# Patient Record
(demographics unavailable — no encounter records)

---

## 2024-10-08 NOTE — PROCEDURE
[Straight Catheterization] : insertion of a straight catheter [Urinary Tract Infection] : a urinary tract infection [___ Fr Straight Tip] : a [unfilled] in [place] straight tip catheter [None] : there were no complications with the catheter insertion [Clear] : clear [Culture] : culture

## 2024-10-08 NOTE — PROCEDURE
[Straight Catheterization] : insertion of a straight catheter [Urinary Tract Infection] : a urinary tract infection [___ Fr Straight Tip] : a [unfilled] in [place]ian straight tip catheter [None] : there were no complications with the catheter insertion [Clear] : clear [Culture] : culture

## 2024-10-09 NOTE — LETTER BODY
[Dear  ___] : Dear  [unfilled], [I had the pleasure of evaluating your patient, [unfilled]. Thank you for referring Ms. [unfilled] for consultation for ___] : I had the pleasure of evaluating your patient, [unfilled]. Thank you for referring Ms. [unfilled] for consultation for [unfilled]. [Attached please find my note.] : Attached please find my note. [Thank you very much for allowing me to participate in the care of this patient. If you have any questions, please do not hesitate to contact me] : Thank you very much for allowing me to participate in the care of this patient. If you have any questions, please do not hesitate to contact me. [FreeTextEntry1] : prolapse, overactive bladder / urgency incontinence

## 2024-10-09 NOTE — PHYSICAL EXAM
[Chaperone Present] : A chaperone was present in the examining room during all aspects of the physical examination [Labia Majora] : were normal [Labia Minora] : were normal [Normal Appearance] : general appearance was normal [Atrophy] : atrophy [Cystocele] : a cystocele [No Bleeding] : there was no active vaginal bleeding [Exam Deferred] : was deferred [67733] : A chaperone was present during the pelvic exam. [Scar] : a scar was noted [Rectocele] : a rectocele [Uterine Prolapse] : uterine prolapse [2] : 2 [Aa ____] : Aa [unfilled] [Ba ____] : Ba [unfilled] [C ____] : C [unfilled] [GH ____] : GH [unfilled] [PB ____] : PB [unfilled] [TVL ____] : TVL  [unfilled] [Ap ____] : Ap [unfilled] [Bp ____] : Bp [unfilled] [D ____] : D [unfilled] [Normal rectal exam] : was normal [Normal] : was normal [FreeTextEntry2] : Janice [FreeTextEntry1] : General: Well, appearing. Alert and orientated. No acute distress HEENT: Normocephalic, atraumatic and extraocular muscles appear to be intact Neck: Full range of motion, no obvious lymphadenopathy, deformities, or masses noted Respiratory: Speaking in full sentences comfortably, normal work of breathing and no cough during visit Musculoskeletal: full range of motion  Extremities: No upper extremity edema noted Skin: no obvious rash or skin lesions Neuro: Orientated X 3, speech is fluent, normal rate Psych: Normal mood and affect [Tenderness] : ~T no ~M abdominal tenderness observed [Distended] : not distended [FreeTextEntry3] : (+) hypermobility, (-) cough stress test

## 2024-10-09 NOTE — HISTORY OF PRESENT ILLNESS
[Vaginal Wall Prolapse] : no [Unable To Restrain Bowel Movement] : no [Urinary Tract Infection] : no [Urinary Frequency] : no [Feelings Of Urinary Urgency] : no [Pain During Urination (Dysuria)] : no [Pelvic Pain] : no [Vaginal Pain] : no [Rectal Pain] : no [Constipation Obstructed Defecation] : no [Stool Visible Blood] : no [Incomplete Emptying Of Stool] : no [x2] : nocturia two times a night [] : years ago [de-identified] : daily [FreeTextEntry5] : daily [de-identified] : daily  [de-identified] : unable to quantify, sometimes every 10 minutes, can hold her urine up to 2 hours  [de-identified] : daily [FreeTextEntry1] : Rachelle is a 77yo who presents with bothersome pelvic bulge symptoms for several weeks. She is interested in surgical correction. She has leakage with an urge. She also voids frequently, sometimes every 10min but can hold her urine up to 2 hours. Sometimes, she uses pantyliners when she goes outside in case she has leakage.   PMH: HLD. Denies history of glaucoma.  PSH: Knee and shoulder surgery Soc: Never smoked All: NKDA   Daily fluid intake: 3 bottles water + 1c coffee + 2-3c tea. No juice, soda, seltzer, alcohol. Drinks tea before bed.

## 2024-10-09 NOTE — DISCUSSION/SUMMARY
[FreeTextEntry1] : Images were used to review the findings of Stage 2 uterovaginal prolapse, cystocele, rectocele with the patient. Treatment options for the prolapse were discussed and included observation, pelvic floor exercises with or without physical therapy, a pessary, or surgical correction. Surgically we discussed the abdominal vs the vaginal routes. Abdominally we discussed a hysterectomy and a sacral colpopexy either via laparotomy or laparoscopically and robotically. Vaginally we discussed a vaginal hysterectomy and native tissue repair. Surgically we discussed hysteropexy as well as the use of biologics. If interested in surgery, we discussed obtaining a pelvic ultrasound, her prior cervical cancer screenings, and urodynamics. We also discussed the perioperative course and reviewed postoperative restrictions of complete pelvic rest and avoidance of strenuous exercise/heavy lifting (>10lb) for 6 weeks.    We reviewed her urinary symptoms and exam findings and discussed possible etiologies including urinary tract infection, overactive bladder, urgency incontinence. Her PVR today was 30ml. Cath urine specimen was sent for urine culture to rule out urinary tract infection. We discussed management options for overactive bladder including observation, fluid and behavioral modifications, bladder training, physical therapy and medications including anticholinergics and beta 3 agonists. Specifically, we discussed avoidance of bladder irritants such as caffeine, carbonation, artificial sweeteners, alcohol, acidic foods/drinks (citrus, tomatoes, pineapple), spicy foods, and chocolate. We also discussed drinking 1-1.5L of plain water to maintain adequate hydration as urine that is too concentrated can also be irritating to the bladder. In addition, we reviewed drinking slowly since ingesting large quantities of fluid can result in rapid distension of the bladder, which can worsen urinary symptoms.  At this time, she is interested in surgical correction of her prolapse. She understands surgery for her prolapse does not treat her urinary symptoms. Her urinary symptoms are less bothersome to her, and she wants to focus on her prolapse first. Pelvic US requisition given to patient.   [facility] handout on POP given to patient in Ugandan. RTO for urodynamics.

## 2024-10-09 NOTE — PHYSICAL EXAM
[Chaperone Present] : A chaperone was present in the examining room during all aspects of the physical examination [Labia Majora] : were normal [Labia Minora] : were normal [Normal Appearance] : general appearance was normal [Atrophy] : atrophy [Cystocele] : a cystocele [No Bleeding] : there was no active vaginal bleeding [Exam Deferred] : was deferred [73223] : A chaperone was present during the pelvic exam. [Scar] : a scar was noted [Rectocele] : a rectocele [Uterine Prolapse] : uterine prolapse [2] : 2 [Aa ____] : Aa [unfilled] [Ba ____] : Ba [unfilled] [C ____] : C [unfilled] [GH ____] : GH [unfilled] [PB ____] : PB [unfilled] [TVL ____] : TVL  [unfilled] [Ap ____] : Ap [unfilled] [Bp ____] : Bp [unfilled] [D ____] : D [unfilled] [Normal rectal exam] : was normal [Normal] : was normal [FreeTextEntry2] : Janice [FreeTextEntry1] : General: Well, appearing. Alert and orientated. No acute distress HEENT: Normocephalic, atraumatic and extraocular muscles appear to be intact Neck: Full range of motion, no obvious lymphadenopathy, deformities, or masses noted Respiratory: Speaking in full sentences comfortably, normal work of breathing and no cough during visit Musculoskeletal: full range of motion  Extremities: No upper extremity edema noted Skin: no obvious rash or skin lesions Neuro: Orientated X 3, speech is fluent, normal rate Psych: Normal mood and affect [Tenderness] : ~T no ~M abdominal tenderness observed [Distended] : not distended [FreeTextEntry3] : (+) hypermobility, (-) cough stress test

## 2024-10-09 NOTE — DISCUSSION/SUMMARY
[FreeTextEntry1] : Images were used to review the findings of Stage 2 uterovaginal prolapse, cystocele, rectocele with the patient. Treatment options for the prolapse were discussed and included observation, pelvic floor exercises with or without physical therapy, a pessary, or surgical correction. Surgically we discussed the abdominal vs the vaginal routes. Abdominally we discussed a hysterectomy and a sacral colpopexy either via laparotomy or laparoscopically and robotically. Vaginally we discussed a vaginal hysterectomy and native tissue repair. Surgically we discussed hysteropexy as well as the use of biologics. If interested in surgery, we discussed obtaining a pelvic ultrasound, her prior cervical cancer screenings, and urodynamics. We also discussed the perioperative course and reviewed postoperative restrictions of complete pelvic rest and avoidance of strenuous exercise/heavy lifting (>10lb) for 6 weeks.    We reviewed her urinary symptoms and exam findings and discussed possible etiologies including urinary tract infection, overactive bladder, urgency incontinence. Her PVR today was 30ml. Cath urine specimen was sent for urine culture to rule out urinary tract infection. We discussed management options for overactive bladder including observation, fluid and behavioral modifications, bladder training, physical therapy and medications including anticholinergics and beta 3 agonists. Specifically, we discussed avoidance of bladder irritants such as caffeine, carbonation, artificial sweeteners, alcohol, acidic foods/drinks (citrus, tomatoes, pineapple), spicy foods, and chocolate. We also discussed drinking 1-1.5L of plain water to maintain adequate hydration as urine that is too concentrated can also be irritating to the bladder. In addition, we reviewed drinking slowly since ingesting large quantities of fluid can result in rapid distension of the bladder, which can worsen urinary symptoms.  At this time, she is interested in surgical correction of her prolapse. She understands surgery for her prolapse does not treat her urinary symptoms. Her urinary symptoms are less bothersome to her, and she wants to focus on her prolapse first. Pelvic US requisition given to patient.   [facility] handout on POP given to patient in [place] Zealander. RTO for urodynamics.

## 2024-10-09 NOTE — HISTORY OF PRESENT ILLNESS
[Vaginal Wall Prolapse] : no [Unable To Restrain Bowel Movement] : no [Urinary Tract Infection] : no [Urinary Frequency] : no [Feelings Of Urinary Urgency] : no [Pain During Urination (Dysuria)] : no [Pelvic Pain] : no [Vaginal Pain] : no [Rectal Pain] : no [Constipation Obstructed Defecation] : no [Stool Visible Blood] : no [Incomplete Emptying Of Stool] : no [x2] : nocturia two times a night [] : years ago [de-identified] : daily [FreeTextEntry5] : daily [de-identified] : daily  [de-identified] : unable to quantify, sometimes every 10 minutes, can hold her urine up to 2 hours  [de-identified] : daily [FreeTextEntry1] : Rachelle is a 79yo who presents with bothersome pelvic bulge symptoms for several weeks. She is interested in surgical correction. She has leakage with an urge. She also voids frequently, sometimes every 10min but can hold her urine up to 2 hours. Sometimes, she uses pantyliners when she goes outside in case she has leakage.   PMH: HLD. Denies history of glaucoma.  PSH: Knee and shoulder surgery Soc: Never smoked All: NKDA   Daily fluid intake: 3 bottles water + 1c coffee + 2-3c tea. No juice, soda, seltzer, alcohol. Drinks tea before bed.

## 2024-10-09 NOTE — REASON FOR VISIT
[Initial Visit ___] : an initial visit for [unfilled] [[employer] ] : provided by [employer]   [Pelvic Organ Prolapse] : pelvic organ prolapse [Interpreters_IDNumber] : [id number] [Interpreters_FullName] : Joao [[id number]] : [place]

## 2024-10-09 NOTE — REASON FOR VISIT
[Initial Visit ___] : an initial visit for [unfilled] [[employer] ] : provided by [employer]   [Pelvic Organ Prolapse] : pelvic organ prolapse [Interpreters_IDNumber] : [id number] [Interpreters_FullName] : Joao [[id number]] : Emir[place]

## 2024-10-22 NOTE — REVIEW OF SYSTEMS
[Incontinence] : incontinence [Nocturia] : nocturia [Joint Pain] : joint pain [Back Pain] : back pain [Negative] : Heme/Lymph [Fatigue] : fatigue [Fever] : no fever [Chills] : no chills [Hot Flashes] : no hot flashes [Night Sweats] : no night sweats [Recent Change In Weight] : ~T no recent weight change [Dysuria] : no dysuria [Hematuria] : no hematuria [Vaginal Discharge] : no vaginal discharge

## 2024-10-22 NOTE — HISTORY OF PRESENT ILLNESS
[FreeTextEntry1] : Stablish Care  [de-identified] : 79 y/o female with medical hx of Osteoporosis, Mastoid fistula, Urinary incontinence,  rectocele, Cystocele, and uterine prolapse presents to the clinic to establish care.  Patient reports that she continues to experience fullness/discomfort around the suprapubic region as well as her vaginal canal. Patient understands that her symptoms are due to Gynecological problems. Patient has an appointment with GYN on October 30th in preparation for GYN surgery. Moreover, during this visit patient complains of chronic left sided lower back pain that radiates to the left extremity. Patient has been work up for sciatica in the past, and has used acupuncture for relieve. Other than that, patient denies any other complains.

## 2024-10-22 NOTE — COUNSELING
[Fall prevention counseling provided] : Fall prevention counseling provided [Potential consequences of obesity discussed] : Potential consequences of obesity discussed [Encouraged to increase physical activity] : Encouraged to increase physical activity [None] : None

## 2024-10-22 NOTE — PHYSICAL EXAM
[No Acute Distress] : no acute distress [Well Nourished] : well nourished [Well-Appearing] : well-appearing [Normal Sclera/Conjunctiva] : normal sclera/conjunctiva [EOMI] : extraocular movements intact [Normal Outer Ear/Nose] : the outer ears and nose were normal in appearance [Normal Oropharynx] : the oropharynx was normal [No Lymphadenopathy] : no lymphadenopathy [Supple] : supple [Thyroid Normal, No Nodules] : the thyroid was normal and there were no nodules present [Clear to Auscultation] : lungs were clear to auscultation bilaterally [Normal Percussion] : the chest was normal to percussion [Normal Rate] : normal rate  [Regular Rhythm] : with a regular rhythm [Normal S1, S2] : normal S1 and S2 [No Carotid Bruits] : no carotid bruits [No Varicosities] : no varicosities [Pedal Pulses Present] : the pedal pulses are present [Soft] : abdomen soft [Non-distended] : non-distended [Normal Bowel Sounds] : normal bowel sounds [Normal Supraclavicular Nodes] : no supraclavicular lymphadenopathy [No CVA Tenderness] : no CVA  tenderness [No Spinal Tenderness] : no spinal tenderness [Grossly Normal Strength/Tone] : grossly normal strength/tone [No Rash] : no rash [No Skin Lesions] : no skin lesions [No Focal Deficits] : no focal deficits [Normal Gait] : normal gait [Deep Tendon Reflexes (DTR)] : deep tendon reflexes were 2+ and symmetric [Normal] : affect was normal and insight and judgment were intact [de-identified] : 1+ pitting B/L lower extremity edema  [de-identified] : Right Periumbillical and suprapubic tenderness noted

## 2024-10-22 NOTE — PLAN
[FreeTextEntry1] : #GYN  Hx of Cystole, rectocele, partial Uterine Prolapse Complaints of suprapubic tenderness. + Suprapubic tenderness on PE.  Urinary frequency/ incontinence/ denies dysuria  - Follow up UA  W/ reflex culture   #Health Maintenance  - CBC, BMP   #Fatigue - Follow up TSH w/ Reflex   #HLD Patient has Hx of HLD, on Rosuvastatin - Follow-up Lipid Panel   #Sciatica Left  The patient complained of Left-sided lower back pain radiating to the left leg. - Follow up Lumbar x-ray  - Physical medicine  - Patient advised to take NSAIDs for pain management   #Easy bruising  PT/INR   #Osteoporosis No recent falls or fractures  DEXA scan done last year, exact date unknown   #Mastoid Fistula  Patient has History of Mastoid Fistula Evaluated by ENT, completed course of abx Reports no symptoms   #Prevention The patient had Flu shot on 10/18/24 Refused Shingles and Pneumoccocal  I will reconsider on the next visit.   RTC once Surgery date is established 
breastfeeding exclusively

## 2024-10-22 NOTE — HEALTH RISK ASSESSMENT
[Fair] : ~his/her~ current health as fair  [Good] : ~his/her~  mood as  good [Intercurrent ED visits] : went to ED [No] : In the past 12 months have you used drugs other than those required for medical reasons? No [No falls in past year] : Patient reported no falls in the past year [0] : 2) Feeling down, depressed, or hopeless: Not at all (0) [PHQ-2 Negative - No further assessment needed] : PHQ-2 Negative - No further assessment needed [Never] : Never [Patient reported mammogram was normal] : Patient reported mammogram was normal [Patient reported bone density results were normal] : Patient reported bone density results were normal [Language] : difficulty with language [Alone] : lives alone [Retired] : retired [Feels Safe at Home] : Feels safe at home [Fully functional (bathing, dressing, toileting, transferring, walking, feeding)] : Fully functional (bathing, dressing, toileting, transferring, walking, feeding) [Fully functional (using the telephone, shopping, preparing meals, housekeeping, doing laundry, using] : Fully functional and needs no help or supervision to perform IADLs (using the telephone, shopping, preparing meals, housekeeping, doing laundry, using transportation, managing medications and managing finances) [de-identified] : In august for ear pain  [de-identified] : Neurology  [de-identified] : Patient walks 30 minutes outside, 5 days a week  [de-identified] : erasmo  [[id number]] : 0 [Change in mental status noted] : No change in mental status noted [Sexually Active] : not sexually active [Reports changes in hearing] : Reports no changes in hearing [Reports changes in vision] : Reports no changes in vision [Reports changes in dental health] : Reports no changes in dental health [MammogramDate] : 10/17/2024 [BoneDensityDate] : 01/01/2024 [BoneDensityComments] : Done last year, at [facility], Patient w/ Osteoporosis  [FreeTextEntry2] : Patient is Jehovah Witness

## 2024-10-22 NOTE — ASSESSMENT
[FreeTextEntry1] : 79 y/o female with medical hx of Osteoporosis, Mastoid fistula, Urinary incontinence, rectocele, Cystocele, and uterine prolapse presents to the clinic to establish Care.

## 2024-11-06 NOTE — DISCUSSION/SUMMARY
[Visit Time ___ Minutes] : [unfilled] minutes [FreeTextEntry1] : Images were used to review the findings of Stage 2 uterovaginal prolapse, cystocele, rectocele with the patient. Treatment options for the prolapse were reviewed and included nonsurgical and surgical options. Surgically we discussed the abdominal vs the vaginal routes. Abdominally we discussed a hysterectomy and a sacral colpopexy either via laparotomy or laparoscopically and robotically. Vaginally we discussed a vaginal hysterectomy and native tissue repair with a uterosacral ligament suspension or an obliterative procedure. She is not sexually active and has no plans to become sexually active in the future. We discussed an obliterative procedure / colpocleisis is the most effective and longest lasting surgery; however, once it is done, she cannot have intercourse in the future and it is not reversible. Surgically we discussed hysteropexy as well as the use of biologics. We discussed her surgical plan may be altered if her pap smear is abnormal. We also discussed the perioperative course and reviewed postoperative restrictions of complete pelvic rest and avoidance of strenuous exercise/heavy lifting (>10lb) for 6 weeks.    We also reviewed her urodynamics findings of stress incontinence. Although there was no detrusor overactivity, we discussed she could still have an overactive bladder. Her PVR and longterm were normal.  Images were also used to demonstrate her incontinence and treatment options. We reviewed management options for stress urinary incontinence including: observation, pelvic floor exercises with or without a physical therapist, continence devices or pessaries, urethral bulking agents, and surgical management. We discussed surgical management options including a midurethral sling, hutchinson colposuspension, and pubovaginal sling using native tissue. We reviewed risks and benefits of each procedure. We discussed a concomitant continence procedure could be done at the same time as her prolapse repair.   She understands that surgery is not intended to treat urgency, frequency, or leakage with an urge since those are symptoms of her overactive bladder, which we would continue to manage postoperatively. In addition, she understands that her refusal of blood products also increases her surgical risk.   At this time, she wants to talk to her family about her surgical options--uterosacral ligament suspension vs colpocleisis. She is interested in surgery as soon as possible.   [facility] handout on USLS, colpocleisis, sling given to patient in English and Frisian. She will call once she has made a decision.

## 2024-11-06 NOTE — HISTORY OF PRESENT ILLNESS
[Vaginal Wall Prolapse] : no [Unable To Restrain Bowel Movement] : no [Urinary Tract Infection] : no [x2] : nocturia two times a night [Urinary Frequency] : no [Feelings Of Urinary Urgency] : no [Pain During Urination (Dysuria)] : no [] : years ago [Constipation Obstructed Defecation] : no [Stool Visible Blood] : no [Incomplete Emptying Of Stool] : no [Pelvic Pain] : no [Vaginal Pain] : no [Rectal Pain] : no [de-identified] : daily [FreeTextEntry5] : daily [de-identified] : daily  [de-identified] : unable to quantify, sometimes every 10 minutes, can hold her urine up to 2 hours  [de-identified] : daily [FreeTextEntry1] : Rachelle is a 79yo with Stage 2 uterovaginal prolapse, cystocele, rectocele. She is interested in surgical correction. She had a pelvic US that was normal. Urodynamics showed stress incontinence. She did not have any detrusor overactivity, ISD. Her PVR was 0ml and senior care was 311ml.   Denies history of bleeding, clotting, or problems with anesthesia. Denies personal or family history of breast or gynecologic malignancies.   Preop Eval: Pap: pending TVUS: 4.8x2.2x3.8cm, ES 0.2cm  UDS: +GERALDO at all volumes, no DO, no ISD, PVR 0ml, senior care 311ml  PMH: HLD. Denies history of glaucoma.  PSH: Knee and shoulder surgery, excisional breast biopsy (benign) Soc: Never smoked All: NKDA   Daily fluid intake: 3 bottles water + 1c coffee + 2-3c tea. No juice, soda, seltzer, alcohol. Drinks tea before bed.

## 2024-11-08 NOTE — PHYSICAL EXAM
[Vulvar Atrophy] : vulvar atrophy [Labia Majora] : normal [Labia Minora] : normal [Atrophy] : atrophy [Cystocele] : a cystocele [Normal] : normal [Uterine Adnexae] : normal [FreeTextEntry4] : 2 cm cyst on anterior vaginal wall near the introitus

## 2024-11-12 NOTE — DISCUSSION/SUMMARY
[Visit Time ___ Minutes] : [unfilled] minutes [FreeTextEntry1] : Rachelle presents with Stage 2 uterovaginal prolapse, cystocele, rectocele. We reviewed both nonsurgical and surgical options, and she continues to desire surgical management. She has been counseled regarding options for reconstructive and obliterative procedures. This includes both abdominal, laparoscopic, robotic, and vaginal options. The options for repairs with and without the use of mesh and biological materials were reviewed. She has elected for the vaginal approach to surgery. Based on our discussion she would like to go with a native tissue repair with a vaginal hysterectomy, uterosacral ligament suspension, and anterior posterior enterocele repair. For her stress incontinence, she desires a midurethral sling with mesh. She expressed understanding that mesh will be used for the antiincontinence procedure and it is a permanent implant.   We discussed success rates, failure rates, and possible risks. The risks discussed include, but are not limited to: changes to the vaginal anatomy, chronic pain, bleeding, dyspareunia, need for revision, vaginal discharge, urinary retention, worsening of stress urinary incontinence or urge incontinence, infection, failure of the procedure, neuropathy. We also extensively reviewed the risk of injury to the bowel, rectum, bladder, ureters, urethra, blood vessels, and nerves. We discussed the risk of sling mesh erosion and need for sling revision. We discussed the possible conversion to laparotomy or laparoscopy. We discussed the possibility of removing the ovaries/fallopian tubes and we indicated the ovaries would be removed only if they appeared grossly abnormal, otherwise they would remain in-situ. All risks were explained in layman's terms. Based on our discussion she would like to proceed with surgical correction. We reviewed the preoperative and postoperative instructions as well as hospital stay.   She had a small vaginal cyst on exam today. We discussed this is likely a simple cyst, but there was a small possibility it could be connected to her urethra (a diverticulum). If connected to the urethra and requires excision and repair of the urethra, we discussed we would not be able to do the sling portion of her surgery as it utilizes synthetic mesh. We discussed it does not appear near enough to the surgical site to interfere with her prolapse surgery as planned; however, she would like to be certain. We will obtain a pelvic MRI.   Consent was signed in the office for pelvic exam under anesthesia, vaginal hysterectomy, uterosacral ligament suspension, anterior posterior enterocele repair, midurethral sling with mesh, cystoscopy. Possible salpingo-oophorectomy, laparoscopy, laparotomy. A refusal of blood product form was also signed in the office as she is a [employer]. She understands that refusal of blood products can result in death.

## 2024-11-12 NOTE — REASON FOR VISIT
[Follow-Up Visit_____] : a follow-up visit for [unfilled] [[employer] ] : provided by [employer]   [Interpreters_IDNumber] : [id number] [Interpreters_FullName] : Chanelle [TWNoteComboBox1] : Cambodian

## 2024-11-12 NOTE — PHYSICAL EXAM
[Chaperone Present] : A chaperone was present in the examining room during all aspects of the physical examination [[id number]] : A chaperone was present during the pelvic exam. [Labia Majora] : were normal [Labia Minora] : were normal [Normal Appearance] : general appearance was normal [Rectocele] : a rectocele [Cystocele] : a cystocele [Uterine Prolapse] : uterine prolapse [No Bleeding] : there was no active vaginal bleeding [Aa ____] : Aa [unfilled] [Ba ____] : Ba [unfilled] [C ____] : C [unfilled] [GH ____] : GH [unfilled] [PB ____] : PB [unfilled] [TVL ____] : TVL  [unfilled] [Ap ____] : Ap [unfilled] [Bp ____] : Bp [unfilled] [D ____] : D [unfilled] [Normal] : no abnormalities [Exam Deferred] : was deferred [FreeTextEntry2] : Janice [FreeTextEntry1] : General: Well, appearing. Alert and orientated. No acute distress HEENT: Normocephalic, atraumatic and extraocular muscles appear to be intact Neck: Full range of motion, no obvious lymphadenopathy, deformities, or masses noted Respiratory: Speaking in full sentences comfortably, normal work of breathing and no cough during visit Musculoskeletal: full range of motion  Extremities: No upper extremity edema noted Skin: no obvious rash or skin lesions Neuro: Orientated X 3, speech is fluent, normal rate Psych: Normal mood and affect [Tenderness] : ~T no ~M abdominal tenderness observed [Distended] : not distended [FreeTextEntry4] : ~1cm vaginal cyst, nonfluctuant, nontender, no urethral discharge expressed with palpation

## 2024-11-12 NOTE — PROCEDURE
[Straight Catheterization] : insertion of a straight catheter [Urinary Tract Infection] : a urinary tract infection [___ Fr Straight Tip] : a [unfilled] in [place] straight tip catheter [None] : there were no complications with the catheter insertion [Clear] : clear [Culture] : culture

## 2024-11-12 NOTE — HISTORY OF PRESENT ILLNESS
[Vaginal Wall Prolapse] : no [Unable To Restrain Bowel Movement] : no [Urinary Tract Infection] : no [x2] : nocturia two times a night [Urinary Frequency] : no [Feelings Of Urinary Urgency] : no [Pain During Urination (Dysuria)] : no [] : years ago [Constipation Obstructed Defecation] : no [Stool Visible Blood] : no [Incomplete Emptying Of Stool] : no [Pelvic Pain] : no [Vaginal Pain] : no [Rectal Pain] : no [de-identified] : daily [FreeTextEntry5] : daily [de-identified] : daily  [de-identified] : unable to quantify, sometimes every 10 minutes, can hold her urine up to 2 hours  [de-identified] : daily [FreeTextEntry1] : Rachelle is a 79yo with Stage 2 uterovaginal prolapse, cystocele, rectocele. She is interested in surgical correction. She is a Christianity and does not accept any blood products.   Denies history of bleeding, clotting, or problems with anesthesia. Denies personal or family history of breast or gynecologic malignancies.   Preop Eval: Pap (11/6/24): NILM, HPV neg TVUS: 4.8x2.2x3.8cm, ES 0.2cm  UDS: +GERALDO at all volumes, no DO, no ISD, PVR 0ml, nursing home 311ml  PMH: HLD. Denies history of glaucoma.  PSH: Knee and shoulder surgery, excisional breast biopsy (benign) Soc: Never smoked All: NKDA   Daily fluid intake: 3 bottles water + 1c coffee + 2-3c tea. No juice, soda, seltzer, alcohol. Drinks tea before bed.

## 2024-11-13 NOTE — ASSESSMENT
[High Risk Surgery - Intraperitoneal, Intrathoracic or Supringuinal Vascular Procedures] : High Risk Surgery - Intraperitoneal, Intrathoracic or Supringuinal Vascular Procedures - No (0) [Ischemic Heart Disease] : Ischemic Heart Disease - No (0) [Congestive Heart Failure] : Congestive Heart Failure - No (0) [Prior Cerebrovascular Accident or TIA] : Prior Cerebrovascular Accident or TIA - No (0) [Creatinine >= 2mg/dL (1 Point)] : Creatinine >= 2mg/dL - No (0) [Insulin-dependent Diabetic (1 Point)] : Insulin-dependent Diabetic - No (0) [0] : 0 , RCRI Class: I, Risk of Post-Op Cardiac Complications: 3.9%, 95% CI for Risk Estimate: 2.8% - 5.4% [Patient Optimized for Surgery] : Patient optimized for surgery [ECG] : ECG [Continue medications as is] : Continue current medications [FreeTextEntry4] : #Pre-operative Clearance METS > 7 RCRI Class 1 risk Jett 0.1% EKG Sinus  Patient medically optimized for surgery

## 2024-11-13 NOTE — END OF VISIT
[] : Resident [FreeTextEntry3] : I, Dr. Emmanuel Farley, saw and evaluated this patient in the presence of the resident. I discussed the management with the resident. I reviewed the note and agree with the documented plan of care with the following confirmations/corrections/additions: None.

## 2024-11-13 NOTE — RESULTS/DATA
[] : results reviewed [ECG Reviewed] : reviewed [Sinus Bradycardia] : sinus bradycardia [P Waves Normal] : the P wave is normal [Normal QRS] : the QRS is normal [Normal ST Segments] : the ST segments are normal [Unavailable] : no prior ECGs were available for comparison [de-identified] : Results from Oct 22 normal. To follow up results from 11/12 [de-identified] : Results from Oct 22 normal. To follow up results from 11/12 [de-identified] : Results from Oct 22 normal. To follow up results from 11/12

## 2024-11-13 NOTE — END OF VISIT
Nutrition Assessment   Assessment Type: Follow up  Reason for Visit: Registered Dietitian Evaluation  Referral Requested By: Nurse  Chart Medications Lab Results Reviewed:  Yes    Nutritional Risk Factors: Unintentional weight loss and Poor PO prior to admission    Current Diet Order: Diabetic  Diet Tolerance:tolelrated  Food Allergies: no  Priority Points: Status 3    Demographic/Anthropometrics Information  Gender: female   Patient Age: 69 year old  Height:    Ht Readings from Last 1 Encounters:   10/18/22 5' 5.35\" (1.66 m)      Weight:   Wt Readings from Last 1 Encounters:   11/02/22 80.2 kg      BMI:   BMI Readings from Last 1 Encounters:   11/02/22 29.10 kg/m²     Ideal Body Wt:   Ideal body weight: 57.8 kg  Adjusted ideal body weight: 66.8 kg      Usual Body Weight:  Weight and Height Weight kg   9/28/2022 85.3 kg   9/29/2022 85.1 kg   10/1/2022 85.1 kg   10/2/2022 85.1 kg   10/3/2022 82.1 kg   10/4/2022 80.6 kg   10/6/2022 80.5 kg   10/7/2022 80.8 kg   10/10/2022 81 kg   10/11/2022    10/13/2022 80.65 kg   10/17/2022 79.6 kg   10/18/2022 80.6 kg     % Weight change: -5 % x three weeks  Reason for Weight Change: Decreased intake    Weight    10/30/22 0515 10/31/22 0417 11/01/22 0600 11/02/22 0746   Weight: 80 kg 78.7 kg 78.9 kg 80.2 kg     Weight trending up   Physical Appearance: Emaciated  Weight Classification: Overweight (BMI 25-29.9)    Nutrition Diagnosis (PES)  Inadequate oral intake related to Decreased intake as evidenced by Weight loss over time    Nutrition Plan  Recommended Nutrition Intervention: Coordination of nutrition care by a nutrition professional  Monitor: Biochemical data, medical tests, procedures, Food and beverage intake and Weight    Discharge Needs: Pending  Care Plan Discussed With: Patient  Goals: Meet >/= 75% of estimated needs  Goal Progress: Extended  Timeframe to Achieve Goal: 1-3 days    Dietitian Notes/Impressions/Recommendations:  Dx: admitted today for Day 2 and 3 of Cycle  Pt unable to participate in OT due to: 
[]  Nausea/vomiting 
[]  Eating 
[]  Pain 
[]  Pt lethargic [x]  Off Unit at 10:18 and 11:10 AM 
Will f/u later as schedule allows. Thank you.  
Surjit Mathews, OT 
 3 R-ICE chemotherapy. Tolerated Rituximab and Etoposide well in clinic yesterday.   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 Reports weight loss recently as she has not been able to eat well. Pt. Lives alone and have no energy to cook. Was mostly eating frozen dinner which are not appetizing anymore. Denies any N/V/D/C at this time.   10/24: Pt. With sub optimal appetite and PO. Does not like the food in here. Tries to eat three times a day. Is compliant with ONS. Denies any GI distress at this time.   1028: Pt. With poor to fair appetite and PO. Reports taste doesn't seems right and complains of dry mouth. Has constipation.   11/2: Pt not alert during at time of visit. Per RN, pt has very poor appetite and PO intake and is barely eating ~5% of meals. RN reported that MD has been notified about poor appetite.     TREATMENT PLAN: Monitoring & Interventions  1. Diet: Diabetic diet appropriate. Consider alternative forms of nutrition administration if pt continues to have poor intake.   - Small frequent meals  - Fluids with all foods  - biotene spray  - Prunes PRN  2. Oral nutrition supplement: Recommend EnsurePlus TID  - Magic cup TID  3. RD monitoring intake trends, weight, labs.  Further recommendations based on clinical course.    Malnutrition Status: Moderate malnutrition related to chronic illness as evidenced by significant weight loss in past three weeks, eating poorly >1week.     Ophelia Rosen  Dietetic Intern  Contact via Epic chat     [] : Resident [FreeTextEntry3] : I, Dr. Emmanuel Farley, saw and evaluated this patient in the presence of the resident. I discussed the management with the resident. I reviewed the note and agree with the documented plan of care with the following confirmations/corrections/additions: None.

## 2024-11-13 NOTE — RESULTS/DATA
[] : results reviewed [ECG Reviewed] : reviewed [Sinus Bradycardia] : sinus bradycardia [P Waves Normal] : the P wave is normal [Normal QRS] : the QRS is normal [Normal ST Segments] : the ST segments are normal [Unavailable] : no prior ECGs were available for comparison [de-identified] : Results from Oct 22 normal. To follow up results from 11/12 [de-identified] : Results from Oct 22 normal. To follow up results from 11/12 [de-identified] : Results from Oct 22 normal. To follow up results from 11/12

## 2024-11-13 NOTE — RESULTS/DATA
[] : results reviewed [ECG Reviewed] : reviewed [Sinus Bradycardia] : sinus bradycardia [P Waves Normal] : the P wave is normal [Normal QRS] : the QRS is normal [Normal ST Segments] : the ST segments are normal [Unavailable] : no prior ECGs were available for comparison [de-identified] : Results from Oct 22 normal. To follow up results from 11/12 [de-identified] : Results from Oct 22 normal. To follow up results from 11/12 [de-identified] : Results from Oct 22 normal. To follow up results from 11/12

## 2024-11-13 NOTE — RESULTS/DATA
[] : results reviewed [ECG Reviewed] : reviewed [Sinus Bradycardia] : sinus bradycardia [P Waves Normal] : the P wave is normal [Normal QRS] : the QRS is normal [Normal ST Segments] : the ST segments are normal [Unavailable] : no prior ECGs were available for comparison [de-identified] : Results from Oct 22 normal. To follow up results from 11/12 [de-identified] : Results from Oct 22 normal. To follow up results from 11/12 [de-identified] : Results from Oct 22 normal. To follow up results from 11/12

## 2024-11-13 NOTE — RESULTS/DATA
[] : results reviewed [ECG Reviewed] : reviewed [Sinus Bradycardia] : sinus bradycardia [P Waves Normal] : the P wave is normal [Normal QRS] : the QRS is normal [Normal ST Segments] : the ST segments are normal [Unavailable] : no prior ECGs were available for comparison [de-identified] : Results from Oct 22 normal. To follow up results from 11/12 [de-identified] : Results from Oct 22 normal. To follow up results from 11/12 [de-identified] : Results from Oct 22 normal. To follow up results from 11/12

## 2024-11-14 NOTE — HISTORY OF PRESENT ILLNESS
[No Pertinent Cardiac History] : no history of aortic stenosis, atrial fibrillation, coronary artery disease, recent myocardial infarction, or implantable device/pacemaker [No Pertinent Pulmonary History] : no history of asthma, COPD, sleep apnea, or smoking [(Patient denies any chest pain, claudication, dyspnea on exertion, orthopnea, palpitations or syncope)] : Patient denies any chest pain, claudication, dyspnea on exertion, orthopnea, palpitations or syncope [Good (7-10 METs)] : Good (7-10 METs) [FreeTextEntry2] : 12/2/2024 [FreeTextEntry4] : 77 y/o F with PMHx osteoporosis, Mastoid fistula, Urinary incontinence, rectocele, Cystocele, and uterine prolapse here to obtain bloodwork for scheduled vaginal hysterectomy, uterosacral ligament suspension, anterior posterior enterocele repair on Dec 2. Reports that urinary symptoms have not changed - when drinking small amounts of water, has to go to bathroom urgently. Denies any current pelvic pain, vaginal bleeding, dysuria. Also reports some mild inflammation on right cheek bone that started 3 days ago - adds that it is most likely due to dry air from outside.  [FreeTextEntry3] : Dr. Aniyah Mckeon [FreeTextEntry1] : Here to complete bloodwork for surgery Dec 2 [de-identified] : 77 y/o F with PMHx osteoporosis, Mastoid fistula, Urinary incontinence, rectocele, Cystocele, and uterine prolapse here to obtain bloodwork for scheduled vaginal hysterectomy, uterosacral ligament suspension, anterior posterior enterocele repair on dec 2.   Reports that urinary symptoms have not changed - when drinking small amounts of water, has to go to bathroom urgently.  Denies any current pelvic pain, vaginal bleeding, dysuria.   METS > 7  Eye swelling right eye - 3 days ago. Saw eye doctor.

## 2024-11-14 NOTE — INTERPRETER SERVICES
[Time Spent: ____ minutes] : Total time spent using  services: [unfilled] minutes. The patient's primary language is not English thus required  services. [Interpreters_IDNumber] : [id number] [TWNoteComboBox1] : Tunisian

## 2024-11-14 NOTE — HISTORY OF PRESENT ILLNESS
[No Pertinent Cardiac History] : no history of aortic stenosis, atrial fibrillation, coronary artery disease, recent myocardial infarction, or implantable device/pacemaker [No Pertinent Pulmonary History] : no history of asthma, COPD, sleep apnea, or smoking [(Patient denies any chest pain, claudication, dyspnea on exertion, orthopnea, palpitations or syncope)] : Patient denies any chest pain, claudication, dyspnea on exertion, orthopnea, palpitations or syncope [Good (7-10 METs)] : Good (7-10 METs) [FreeTextEntry2] : 12/2/2024 [FreeTextEntry4] : 79 y/o F with PMHx osteoporosis, Mastoid fistula, Urinary incontinence, rectocele, Cystocele, and uterine prolapse here to obtain bloodwork for scheduled vaginal hysterectomy, uterosacral ligament suspension, anterior posterior enterocele repair on Dec 2. Reports that urinary symptoms have not changed - when drinking small amounts of water, has to go to bathroom urgently. Denies any current pelvic pain, vaginal bleeding, dysuria. Also reports some mild inflammation on right cheek bone that started 3 days ago - adds that it is most likely due to dry air from outside.  [FreeTextEntry3] : Dr. Aniyah Mckeon [FreeTextEntry1] : Here to complete bloodwork for surgery Dec 2 [de-identified] : 79 y/o F with PMHx osteoporosis, Mastoid fistula, Urinary incontinence, rectocele, Cystocele, and uterine prolapse here to obtain bloodwork for scheduled vaginal hysterectomy, uterosacral ligament suspension, anterior posterior enterocele repair on dec 2.   Reports that urinary symptoms have not changed - when drinking small amounts of water, has to go to bathroom urgently.  Denies any current pelvic pain, vaginal bleeding, dysuria.   METS > 7  Eye swelling right eye - 3 days ago. Saw eye doctor.

## 2024-11-14 NOTE — INTERPRETER SERVICES
[Time Spent: ____ minutes] : Total time spent using  services: [unfilled] minutes. The patient's primary language is not English thus required  services. [Interpreters_IDNumber] : [id number] [TWNoteComboBox1] : Citizen of [place]

## 2024-11-14 NOTE — INTERPRETER SERVICES
[Time Spent: ____ minutes] : Total time spent using  services: [unfilled] minutes. The patient's primary language is not English thus required  services. [Interpreters_IDNumber] : [id number] [TWNoteComboBox1] : Martiniquais

## 2024-11-14 NOTE — HISTORY OF PRESENT ILLNESS
[No Pertinent Cardiac History] : no history of aortic stenosis, atrial fibrillation, coronary artery disease, recent myocardial infarction, or implantable device/pacemaker [No Pertinent Pulmonary History] : no history of asthma, COPD, sleep apnea, or smoking [(Patient denies any chest pain, claudication, dyspnea on exertion, orthopnea, palpitations or syncope)] : Patient denies any chest pain, claudication, dyspnea on exertion, orthopnea, palpitations or syncope [Good (7-10 METs)] : Good (7-10 METs) [FreeTextEntry2] : 12/2/2024 [FreeTextEntry3] : Dr. Aniyah Mckeon [FreeTextEntry4] : 77 y/o F with PMHx osteoporosis, Mastoid fistula, Urinary incontinence, rectocele, Cystocele, and uterine prolapse here to obtain bloodwork for scheduled vaginal hysterectomy, uterosacral ligament suspension, anterior posterior enterocele repair on Dec 2. Reports that urinary symptoms have not changed - when drinking small amounts of water, has to go to bathroom urgently. Denies any current pelvic pain, vaginal bleeding, dysuria. Also reports some mild inflammation on right cheek bone that started 3 days ago - adds that it is most likely due to dry air from outside.  [FreeTextEntry1] : Here to complete bloodwork for surgery Dec 2 [de-identified] : 79 y/o F with PMHx osteoporosis, Mastoid fistula, Urinary incontinence, rectocele, Cystocele, and uterine prolapse here to obtain bloodwork for scheduled vaginal hysterectomy, uterosacral ligament suspension, anterior posterior enterocele repair on dec 2.   Reports that urinary symptoms have not changed - when drinking small amounts of water, has to go to bathroom urgently.  Denies any current pelvic pain, vaginal bleeding, dysuria.   METS > 7  Eye swelling right eye - 3 days ago. Saw eye doctor.

## 2024-11-14 NOTE — INTERPRETER SERVICES
[Time Spent: ____ minutes] : Total time spent using  services: [unfilled] minutes. The patient's primary language is not English thus required  services. [Interpreters_IDNumber] : [id number] [TWNoteComboBox1] : Vietnamese

## 2024-11-14 NOTE — INTERPRETER SERVICES
[Time Spent: ____ minutes] : Total time spent using  services: [unfilled] minutes. The patient's primary language is not English thus required  services. [Interpreters_IDNumber] : [id number] [TWNoteComboBox1] : Uzbek

## 2024-11-14 NOTE — HISTORY OF PRESENT ILLNESS
[No Pertinent Cardiac History] : no history of aortic stenosis, atrial fibrillation, coronary artery disease, recent myocardial infarction, or implantable device/pacemaker [No Pertinent Pulmonary History] : no history of asthma, COPD, sleep apnea, or smoking [(Patient denies any chest pain, claudication, dyspnea on exertion, orthopnea, palpitations or syncope)] : Patient denies any chest pain, claudication, dyspnea on exertion, orthopnea, palpitations or syncope [Good (7-10 METs)] : Good (7-10 METs) [FreeTextEntry2] : 12/2/2024 [FreeTextEntry3] : Dr. Aniyah Mckeon [FreeTextEntry4] : 77 y/o F with PMHx osteoporosis, Mastoid fistula, Urinary incontinence, rectocele, Cystocele, and uterine prolapse here to obtain bloodwork for scheduled vaginal hysterectomy, uterosacral ligament suspension, anterior posterior enterocele repair on Dec 2. Reports that urinary symptoms have not changed - when drinking small amounts of water, has to go to bathroom urgently. Denies any current pelvic pain, vaginal bleeding, dysuria. Also reports some mild inflammation on right cheek bone that started 3 days ago - adds that it is most likely due to dry air from outside.  [FreeTextEntry1] : Here to complete bloodwork for surgery Dec 2 [de-identified] : 77 y/o F with PMHx osteoporosis, Mastoid fistula, Urinary incontinence, rectocele, Cystocele, and uterine prolapse here to obtain bloodwork for scheduled vaginal hysterectomy, uterosacral ligament suspension, anterior posterior enterocele repair on dec 2.   Reports that urinary symptoms have not changed - when drinking small amounts of water, has to go to bathroom urgently.  Denies any current pelvic pain, vaginal bleeding, dysuria.   METS > 7  Eye swelling right eye - 3 days ago. Saw eye doctor.
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
## 2024-11-22 NOTE — HISTORY OF PRESENT ILLNESS
[FreeTextEntry1] : follow-up [de-identified] : Patient is a 79 y/o F with PMHx HLD, osteoporosis, Mastoid fistula, Urinary incontinence, rectocele, Cystocele, and uterine prolapse here to obtain bloodwork for scheduled vaginal hysterectomy, uterosacral ligament suspension, anterior posterior enterocele repair on Dec 2, 2024. Still reports that urinary symptoms have not changed - when drinking small amounts of water, has to go to bathroom urgently. She denies any headache, dizziness, nausea, vomiting, cough, fever, chest pain, shortness of breath, palpitation, heartburn, abdominal pain, diarrhea, constipation, dysuria, hematuria, back pain, joint pain, weight loss, loss of appetite.

## 2024-12-06 NOTE — OBJECTIVE
[Voiding Trial] : Voiding trial was performed [Clean, Dry, Intact] : Clean, Dry, Intact [Good Support] : Good support [Healing well] : healing well [No Masses or Tenderness] : no masses or tenderness [FreeTextEntry1] : No CVA tenderness, mild tenderness to palpation of the bladder [FreeTextEntry3] : No mesh visualized, sutures intact, no active bleeding

## 2024-12-06 NOTE — SUBJECTIVE
[FreeTextEntry1] : No acute distress noted. Denies dizziness, lightheadedness currently.  [FreeTextEntry8] : Tylenol, ibuprofen, oxycodone [FreeTextEntry7] : Moderately controlled with PO meds [FreeTextEntry6] : Tolerating regular diet. No nausea/vomiting [FreeTextEntry5] : Mcdowell cath in situ and draining [FreeTextEntry4] : Taking miralax.  [FreeTextEntry9] : Some vaginal bleeding -- used 2 pads yesterday, no clots

## 2024-12-06 NOTE — DISCUSSION/SUMMARY
[FreeTextEntry1] : Rachelle is a 77yo s/p vaginal hysterectomy, uterosacral ligament suspension, anterior posterior enterocele repair, retropubic midurethral sling, cystoscopy on 12/2/24. She passed her TOV today. We reviewed emergency precautions. We discussed taking an antibiotic for a presumed UTI.   RTO on 12/17 as scheduled for POA.

## 2024-12-06 NOTE — REASON FOR VISIT
[Follow-up Visit ___] : a follow-up visit  for [unfilled] [[employer] ] : provided by [employer]   [Interpreters_IDNumber] : [id number] [Interpreters_FullName] : Terrie [TWNoteComboBox1] : Bel[place]ian

## 2024-12-17 NOTE — OBJECTIVE
[Post Void Residual ____ ml] : Post Void Residual was [unfilled] ml [Soft and Nontender] : soft and nontender [Clean, Dry, Intact] : Clean, Dry, Intact [Good Support] : Good support [Healing well] : healing well [No Masses or Tenderness] : no masses or tenderness [FreeTextEntry3] : No mesh visualized, sutures intact, no active bleeding

## 2024-12-17 NOTE — SUBJECTIVE
[FreeTextEntry1] : No acute distress noted.  [FreeTextEntry8] : None [FreeTextEntry7] : None [FreeTextEntry6] : Tolerating regular diet. No nausea/vomiting [FreeTextEntry4] : Taking miralax. No constipation [FreeTextEntry5] : Normal. No dysuria. A few episodes of incontinence when she coughed and sneezed. Feels incomplete emptying sometimes. Yesterday, she voided hourly and 3x/n.  [FreeTextEntry9] : Some vaginal bleeding

## 2024-12-17 NOTE — REASON FOR VISIT
[Follow-up Visit ___] : a follow-up visit  for [unfilled] [[employer] ] : provided by [employer]   [Interpreters_IDNumber] : [id number] [Interpreters_FullName] : Jim Gregory [TWNoteComboBox1] : Scottish

## 2024-12-17 NOTE — DISCUSSION/SUMMARY
[FreeTextEntry1] : Rachelle is a 79yo s/p vaginal hysterectomy, uterosacral ligament suspension, anterior posterior enterocele repair, retropubic midurethral sling, cystoscopy on 12/2/24. Cath PVR was 30ml after she voided a second time. Urine sent for UC to rule out urinary tract infection. She is meeting her postop milestones. Pathology was benign and reviewed with the patient. Postop instructions reviewed.   RTO in 4wk for POA.

## 2025-01-14 NOTE — DISCUSSION/SUMMARY
[FreeTextEntry1] : Rachelle is a 79yo s/p vaginal hysterectomy, uterosacral ligament suspension, anterior posterior enterocele repair, retropubic midurethral sling, cystoscopy on 12/2/24. She is meeting her postop milestones. She had significant atrophy on exam and some adhesion of her vaginal incisions. In postmenopausal women, we discussed the decreased level of estrogen in the vaginal tissues can result in atrophy and cause her incisions to heal together, which can ultimately result in scarring of the vagina. Risk and benefits of vaginal estrogen were reviewed including its local action, minimal systemic absorption, and lack of association with any breast or GYN malignancies. eRx for vaginal estrogen sent.   RTO in  for atrophy follow up.

## 2025-01-14 NOTE — OBJECTIVE
[Soft and Nontender] : soft and nontender [Clean, Dry, Intact] : Clean, Dry, Intact [Good Support] : Good support [Healing well] : healing well [No Masses or Tenderness] : no masses or tenderness [FreeTextEntry3] : No mesh visualized, sutures intact, no active bleeding, Significant atrophy with some adhesion of the anterior and posterior vaginal incisions that was gently  on exam

## 2025-01-14 NOTE — SUBJECTIVE
[FreeTextEntry1] : No acute distress noted.  [FreeTextEntry8] : None [FreeTextEntry7] : None [FreeTextEntry6] : Tolerating regular diet. No nausea/vomiting [FreeTextEntry5] : Normal. No dysuria, incontinence, incomplete emptying.  [FreeTextEntry4] : Taking miralax. No constipation [FreeTextEntry9] : Some vaginal discharge

## 2025-01-14 NOTE — REASON FOR VISIT
[Follow-up Visit ___] : a follow-up visit  for [unfilled] [[employer] ] : provided by [employer]   [Interpreters_IDNumber] : [id number] [Interpreters_FullName] : Hasmukh [TWNoteComboBox1] : Sammarinese

## 2025-04-16 NOTE — HISTORY OF PRESENT ILLNESS
[Cystocele (Obstetric)] : no [Vaginal Wall Prolapse] : no [Rectal Prolapse] : no [Unable To Restrain Bowel Movement] : no [Urinary Tract Infection] : no [Urinary Frequency] : no [Feelings Of Urinary Urgency] : no [Pain During Urination (Dysuria)] : no [Stool Visible Blood] : no [Incomplete Emptying Of Stool] : no [Pelvic Pain] : no [Vaginal Pain] : no [Rectal Pain] : no [x2] : nocturia two times a night [] : years ago [de-identified] : daily leakage with an urge  [de-identified] : 5-6x/d, can hold urine 3-4h  [de-identified] : sometimes  [FreeTextEntry6] : BM every 1-2 days, sometimes hard, managing with diet  [FreeTextEntry1] : Rachelle is a 79yo s/p vaginal hysterectomy, uterosacral ligament suspension, anterior posterior enterocele repair, retropubic midurethral sling, cystoscopy on 12/2/24. She was last seen on 1/14/25 and was started on vaginal estrogen for significant atrophy and some vaginal adhesions, which were gently  on exam. She has been using nonhormonal moisturizers twice a week. She tried vaginal estrogen but had some burning and stopped using it.    Preoperatively, she had an overactive bladder and urgency incontinence.   PMH: HLD. Denies history of glaucoma.  PSH: Knee and shoulder surgery, excisional breast biopsy (benign), TVH/USLS/sling  Soc: Never smoked All: NKDA   Daily fluid intake: 3-4 bottles water + 2-3c tea. 2c coffee per week. No juice, soda, seltzer, alcohol. Drinks tea before bed.

## 2025-04-16 NOTE — REASON FOR VISIT
[Follow-Up Visit_____] : a follow-up visit for [unfilled] [[employer] ] : provided by [employer]   [Interpreters_IDNumber] : [id number] [Interpreters_FullName] : Eddie Lopez [TWNoteComboBox1] : Guatemalan

## 2025-04-16 NOTE — PHYSICAL EXAM
[MA] : MA [Labia Majora] : were normal [Labia Minora] : were normal [Normal Appearance] : general appearance was normal [Atrophy] : atrophy [Absent] : absent [Normal] : no abnormalities [Exam Deferred] : was deferred [FreeTextEntry2] : Janice [FreeTextEntry1] : General: Well, appearing. Alert and orientated. No acute distress HEENT: Normocephalic, atraumatic and extraocular muscles appear to be intact Neck: Full range of motion, no obvious lymphadenopathy, deformities, or masses noted Respiratory: Speaking in full sentences comfortably, normal work of breathing and no cough during visit Musculoskeletal: full range of motion  Extremities: No upper extremity edema noted Skin: no obvious rash or skin lesions Neuro: Orientated X 3, speech is fluent, normal rate Psych: Normal mood and affect [FreeTextEntry4] : No mesh visualized  [de-identified] : No prolapse

## 2025-05-06 NOTE — ASSESSMENT
[FreeTextEntry1] : viral uri 2/2 hMPV - perisistent sxms for over a week, perisistent cough w/u in ED positive for hMPV - rx prednisone 20mgx 5 days - benzonatate 200mg prn - c/w symptomatic mgmt   Osteoporosis  - due for DEXA - rx dexa

## 2025-05-06 NOTE — PHYSICAL EXAM
[Normal Voice/Communication] : normal voice/communication [Normal] : soft, non-tender, non-distended, no masses palpated, no HSM and normal bowel sounds

## 2025-05-08 NOTE — HISTORY OF PRESENT ILLNESS
[FreeTextEntry1] : follow up [de-identified] : Patient is a 77 y/o F with PMHx HLD, osteoporosis, Mastoid fistula, Urinary incontinence, rectocele, Cystocele, and uterine prolapse here

## 2025-05-08 NOTE — HISTORY OF PRESENT ILLNESS
[FreeTextEntry1] : follow up [de-identified] : Patient is a 79 y/o F with PMHx HLD, osteoporosis, Mastoid fistula, Urinary incontinence, rectocele, Cystocele, and uterine prolapse here

## 2025-05-08 NOTE — HISTORY OF PRESENT ILLNESS
[FreeTextEntry1] : follow up [de-identified] : Patient is a 79 y/o F with PMHx HLD, osteoporosis, Mastoid fistula, Urinary incontinence, rectocele, Cystocele, and uterine prolapse here

## 2025-06-10 NOTE — PHYSICAL EXAM
[MA] : MA [Labia Majora] : were normal [Labia Minora] : were normal [Normal Appearance] : general appearance was normal [Atrophy] : atrophy [Absent] : absent [Normal] : no abnormalities [Exam Deferred] : was deferred [FreeTextEntry2] : Janice [FreeTextEntry1] : General: Well, appearing. Alert and orientated. No acute distress HEENT: Normocephalic, atraumatic and extraocular muscles appear to be intact Neck: Full range of motion, no obvious lymphadenopathy, deformities, or masses noted Respiratory: Speaking in full sentences comfortably, normal work of breathing and no cough during visit Musculoskeletal: full range of motion  Extremities: No upper extremity edema noted Skin: no obvious rash or skin lesions Neuro: Orientated X 3, speech is fluent, normal rate Psych: Normal mood and affect [FreeTextEntry4] : No mesh visualized  [de-identified] : No prolapse

## 2025-06-10 NOTE — DISCUSSION/SUMMARY
[FreeTextEntry1] : Rachelle is a 78yo s/p vaginal hysterectomy, uterosacral ligament suspension, anterior posterior enterocele repair, retropubic midurethral sling, cystoscopy. She had significant atrophy and a vaginal adhesion, which was treated. We reviewed the importance of using the nonhormonal moisturizers and vaginal estrogen. We discussed the moisturizers are not as effective but can help with some of the dryness and that once her dryness improves, she may not have as much irritation from the vaginal estrogen, which may be from the additives. We discussed trying the estrogen suppository, which may not cause as much irritation as the cream. eRx sent.   We reviewed her urinary symptoms and reviewed management options for overactive bladder including observation, fluid and behavioral modifications, bladder training, physical therapy and medications including anticholinergics and beta 3 agonists. Specifically, we discussed avoidance of bladder irritants such as caffeine, carbonation, artificial sweeteners, alcohol, acidic foods/drinks (citrus, tomatoes, pineapple), spicy foods, and chocolate. We also discussed drinking 1.5-2L of plain water to maintain adequate hydration as urine that is too concentrated can also be irritating to the bladder. In addition, we reviewed drinking slowly since ingesting large quantities of fluid can result in rapid distension of the bladder, which can worsen urinary symptoms. We discussed that there are many overactive bladder medications; however, they have multiple side effects. We discussed third line treatment options including PTNS, intra detrusor Botox, and sacral neuromodulation.  At this time, she will continue with the moisturizers and add vaginal estrogen as well. She will also start a medication. eRx for solifenacin 10mg sent to her pharmacy (preferred by her insurance, trospium not covered).   RTO 3m for OAB, atrophy follow up.

## 2025-06-10 NOTE — HISTORY OF PRESENT ILLNESS
[Cystocele (Obstetric)] : no [Vaginal Wall Prolapse] : no [Rectal Prolapse] : no [Urinary Tract Infection] : no [x2] : nocturia two times a night [Urinary Frequency] : no [Feelings Of Urinary Urgency] : no [Pain During Urination (Dysuria)] : no [Stool Visible Blood] : no [Incomplete Emptying Of Stool] : no [Pelvic Pain] : no [Vaginal Pain] : no [Rectal Pain] : no [Uses ___ pads per day] : uses [unfilled] pad(s) per day [] : yes [de-identified] : with coughing, laughing, sneezing  [de-identified] : daily leakage with an urge  [de-identified] : 10-12x/d, can hold urine for only 1 hour, but sometimes urinates every 20 minutes  [FreeTextEntry6] : BM every 1-2 days, soft, managing with diet  [de-identified] : sometimes  [FreeTextEntry1] : Rachelle is a 80yo s/p vaginal hysterectomy, uterosacral ligament suspension, anterior posterior enterocele repair, retropubic midurethral sling, cystoscopy on 12/2/24. She was last seen on 4/16/25 and had significant atrophy which had resulted in vaginal adhesions. She has been using nonhormonal moisturizers twice a day. She had significant burning discomfort with vaginal estrogen.   Preoperatively, she had an overactive bladder and urgency incontinence.   PMH: HLD. Denies history of glaucoma.  PSH: Knee and shoulder surgery, excisional breast biopsy (benign), TVH/USLS/sling  Soc: Never smoked All: NKDA   Daily fluid intake: 3-4 bottles water + 1-2c tea + 1c coffee. No juice, soda, seltzer, alcohol. Drinks tea before bed.

## 2025-06-10 NOTE — REASON FOR VISIT
[Follow-Up Visit_____] : a follow-up visit for [unfilled] [Patient Declined  Services] : - None: Patient declined  services

## 2025-07-15 NOTE — HEALTH RISK ASSESSMENT
[No] : No [Never (0 pts)] : Never (0 points) [0] : 2) Feeling down, depressed, or hopeless: Not at all (0) [PHQ-2 Negative - No further assessment needed] : PHQ-2 Negative - No further assessment needed [No falls in past year] : Patient reported no falls in the past year [Never] : Never [de-identified] : Home cooked meals

## 2025-07-15 NOTE — HEALTH RISK ASSESSMENT
[No] : No [Never (0 pts)] : Never (0 points) [0] : 2) Feeling down, depressed, or hopeless: Not at all (0) [PHQ-2 Negative - No further assessment needed] : PHQ-2 Negative - No further assessment needed [No falls in past year] : Patient reported no falls in the past year [Never] : Never [de-identified] : Home cooked meals

## 2025-07-15 NOTE — PLAN
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

## 2025-07-15 NOTE — HISTORY OF PRESENT ILLNESS
[FreeTextEntry1] : Follow Up [de-identified] : 79F PMHx HLD, osteoporosis, Mastoid fistula, Urinary incontinence, rectocele, Cystocele, and uterine prolapse presents for a follow-up. Pt is complaining of a dry cough that has persisted since yesterday and chronic bilateral ankle swelling. Pt states she stopped taking her prednisone after two days. She is still taking her Benzonatate PRN for cough. She states she has not had any previous fractures or bone breaks. Pt endorses right knee pain that is worse with excessive movement. 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 No other complaints.

## 2025-07-15 NOTE — PHYSICAL EXAM
[Declined Breast Exam] : declined breast exam  [Declined Rectal Exam] : declined rectal exam [Normal] : affect was normal and insight and judgment were intact [de-identified] : Bilateral knee crepitus upon extension. Left knee pain upon extension. Bilateral lower ankle swelling.

## 2025-07-15 NOTE — REVIEW OF SYSTEMS
[Postnasal Drip] : postnasal drip [Lower Ext Edema] : lower extremity edema [Joint Pain] : joint pain [Negative] : Heme/Lymph [Cough] : no cough

## 2025-07-15 NOTE — PHYSICAL EXAM
[Declined Breast Exam] : declined breast exam  [Declined Rectal Exam] : declined rectal exam [Normal] : affect was normal and insight and judgment were intact [de-identified] : Bilateral knee crepitus upon extension. Left knee pain upon extension. Bilateral lower ankle swelling.

## 2025-07-15 NOTE — ASSESSMENT
[FreeTextEntry1] : 79F PMHx HLD, osteoporosis, Mastoid fistula, Urinary incontinence, rectocele, Cystocele, and uterine prolapse presents for follow up

## 2025-07-15 NOTE — HISTORY OF PRESENT ILLNESS
[FreeTextEntry1] : Follow Up [de-identified] : 79F PMHx HLD, osteoporosis, Mastoid fistula, Urinary incontinence, rectocele, Cystocele, and uterine prolapse presents for a follow-up. Pt is complaining of a dry cough that has persisted since yesterday and chronic bilateral ankle swelling. Pt states she stopped taking her prednisone after two days. She is still taking her Benzonatate PRN for cough. She states she has not had any previous fractures or bone breaks. Pt endorses right knee pain that is worse with excessive movement. 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 No other complaints.